# Patient Record
Sex: MALE | NOT HISPANIC OR LATINO | Employment: FULL TIME | URBAN - METROPOLITAN AREA
[De-identification: names, ages, dates, MRNs, and addresses within clinical notes are randomized per-mention and may not be internally consistent; named-entity substitution may affect disease eponyms.]

---

## 2020-11-17 ENCOUNTER — TELEPHONE (OUTPATIENT)
Dept: NEUROSURGERY | Facility: CLINIC | Age: 35
End: 2020-11-17

## 2020-11-17 ENCOUNTER — OFFICE VISIT (OUTPATIENT)
Dept: NEUROSURGERY | Facility: CLINIC | Age: 35
End: 2020-11-17

## 2020-11-17 ENCOUNTER — PATIENT MESSAGE (OUTPATIENT)
Dept: NEUROSURGERY | Facility: CLINIC | Age: 35
End: 2020-11-17

## 2020-11-17 ENCOUNTER — HOSPITAL ENCOUNTER (OUTPATIENT)
Dept: GENERAL RADIOLOGY | Facility: HOSPITAL | Age: 35
Discharge: HOME | End: 2020-11-17
Admitting: PHYSICIAN ASSISTANT

## 2020-11-17 VITALS
SYSTOLIC BLOOD PRESSURE: 120 MMHG | BODY MASS INDEX: 29.44 KG/M2 | DIASTOLIC BLOOD PRESSURE: 80 MMHG | TEMPERATURE: 96.8 F | HEIGHT: 69 IN | WEIGHT: 198.8 LBS

## 2020-11-17 DIAGNOSIS — G89.29 CHRONIC MIDLINE THORACIC BACK PAIN: ICD-10-CM

## 2020-11-17 DIAGNOSIS — M54.6 CHRONIC MIDLINE THORACIC BACK PAIN: ICD-10-CM

## 2020-11-17 DIAGNOSIS — Z71.6 ENCOUNTER FOR SMOKING CESSATION COUNSELING: ICD-10-CM

## 2020-11-17 DIAGNOSIS — M50.30 DEGENERATION OF CERVICAL INTERVERTEBRAL DISC: ICD-10-CM

## 2020-11-17 DIAGNOSIS — G89.29 CHRONIC MIDLINE THORACIC BACK PAIN: Primary | ICD-10-CM

## 2020-11-17 DIAGNOSIS — M54.6 CHRONIC MIDLINE THORACIC BACK PAIN: Primary | ICD-10-CM

## 2020-11-17 DIAGNOSIS — S39.012A STRAIN, BACK, INITIAL ENCOUNTER: ICD-10-CM

## 2020-11-17 PROCEDURE — 72080 X-RAY EXAM THORACOLMB 2/> VW: CPT

## 2020-11-17 PROCEDURE — 99204 OFFICE O/P NEW MOD 45 MIN: CPT | Performed by: PHYSICIAN ASSISTANT

## 2020-11-17 RX ORDER — OMEPRAZOLE 20 MG/1
CAPSULE, DELAYED RELEASE ORAL
COMMUNITY
Start: 2020-11-10

## 2020-11-17 RX ORDER — IBUPROFEN 600 MG/1
600 TABLET ORAL EVERY 6 HOURS PRN
COMMUNITY

## 2020-11-17 NOTE — TELEPHONE ENCOUNTER
From: Cristhian Sherman  To: Luna Euceda PA-C  Sent: 11/17/2020 2:10 PM EST  Subject: Non-Urgent Medical Question    Do you have an idea when I can return to my normal line of work? I know it will be painful i just don't want to rush it.

## 2020-11-17 NOTE — TELEPHONE ENCOUNTER
PT IS WANTING A RETURN TO WORK NOTE. HOWEVER, HE IS STILL STATING THAT HE IS IN A LOT OF PAIN AND ONLY GETTING MINIMAL SLEEP DUE TO THE PAIN.  PT NUMBER [phone]  PLEASE ADVISE

## 2020-11-17 NOTE — PROGRESS NOTES
Patient: Cristhian Sherman  : 1985  Chart #: [id number]    Date of Service: 2020    CHIEF COMPLAINT: Diffuse spinal pain    History of Present Illness Mr. 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  He is new to our clinic.  He denies significant history of back difficulties.  On 2020, he was at work pulling carpet when he felt a pop in his neck and mid back.  Since that time he has had neck pain mid back pain and low back pain.  The mid back pain bothers him most.  He has been treated with formal physical therapy.  He says this is helped him feel more limber and helped with stiffness.  It has not helped significantly with his pain.  He has been taking ibuprofen and Flexeril.  His symptoms are worse when standing longer than an hour or sitting.  Nothing significantly improves his pain.  He denies radicular complaints including numbness, weakness, or pain into the upper or lower extremities.  No bowel or bladder difficulties.  He does smoke tobacco products heavily x26 years.      History reviewed. No pertinent past medical history.      Current Outpatient Medications:   •  ibuprofen (ADVIL,MOTRIN) 600 MG tablet, Take 600 mg by mouth Every 6 (Six) Hours As Needed for Mild Pain ., Disp: , Rfl:   •  omeprazole (priLOSEC) 20 MG capsule, , Disp: , Rfl:     Past Surgical History:   Procedure Laterality Date   • INGUINAL HERNIA REPAIR         Social History     Socioeconomic History   • Marital status:      Spouse name: Not on file   • Number of children: Not on file   • Years of education: Not on file   • Highest education level: Not on file   Tobacco Use   • Smoking status: Current Every Day Smoker     Packs/day: 1.50     Years: 26.00     Pack years: 39.00     Types: Cigarettes     Start date: 1994   • Smokeless tobacco: Never Used   Substance and Sexual Activity   • Alcohol use: Yes     Comment: socially   • Drug use: Yes     Types: Marijuana      Comment: stopped 5 years ago   • Sexual activity: Defer     I discussed >3m the need to STOP smoking, there is a direct correlation between smoking and wound healing and degenerative disc disease. Patient will take this under advisement and discuss with their primary provider.    Review of Systems   Constitutional: Negative for activity change, appetite change, chills, diaphoresis, fatigue, fever and unexpected weight change.   HENT: Negative for congestion, dental problem, drooling, ear discharge, ear pain, facial swelling, hearing loss, mouth sores, nosebleeds, postnasal drip, rhinorrhea, sinus pressure, sinus pain, sneezing, sore throat, tinnitus, trouble swallowing and voice change.    Eyes: Negative for photophobia, pain, discharge, redness, itching and visual disturbance.   Respiratory: Negative for apnea, cough, choking, chest tightness, shortness of breath, wheezing and stridor.    Cardiovascular: Negative for chest pain, palpitations and leg swelling.   Gastrointestinal: Negative for abdominal distention, abdominal pain, anal bleeding, blood in stool, constipation, diarrhea, nausea, rectal pain and vomiting.   Endocrine: Negative for cold intolerance, heat intolerance, polydipsia, polyphagia and polyuria.   Genitourinary: Negative for decreased urine volume, difficulty urinating, discharge, dysuria, enuresis, flank pain, frequency, genital sores, hematuria, penile pain, penile swelling, scrotal swelling, testicular pain and urgency.   Musculoskeletal: Positive for back pain. Negative for arthralgias, gait problem, joint swelling, myalgias, neck pain and neck stiffness.   Skin: Negative for color change, pallor, rash and wound.   Allergic/Immunologic: Negative for environmental allergies, food allergies and immunocompromised state.   Neurological: Negative for dizziness, tremors, seizures, syncope, facial asymmetry, speech difficulty, weakness, light-headedness, numbness and headaches.   Hematological:  "Negative for adenopathy. Does not bruise/bleed easily.   Psychiatric/Behavioral: Negative for agitation, behavioral problems, confusion, decreased concentration, dysphoric mood, hallucinations, self-injury, sleep disturbance and suicidal ideas. The patient is not nervous/anxious and is not hyperactive.        Objective   Vital Signs: Blood pressure 120/80, temperature 96.8 °F (36 °C), temperature source Infrared, height 175.3 cm (69\"), weight 90.2 kg (198 lb 12.8 oz).  Physical Exam  Vitals signs and nursing note reviewed.   Constitutional:       General: He is not in acute distress.     Appearance: He is well-developed.   HENT:      Head: Normocephalic and atraumatic.   Eyes:      Pupils: Pupils are equal, round, and reactive to light.   Cardiovascular:      Heart sounds: Normal heart sounds.   Pulmonary:      Breath sounds: Normal breath sounds.   Psychiatric:         Behavior: Behavior normal.         Thought Content: Thought content normal.     Musculoskeletal:     Strength is intact in upper and lower extremities to direct testing.     Station and gait are normal.     Straight leg raising is negative.   Neurologic:     Muscle tone is normal throughout.     Coordination is intact.     Deep tendon reflexes: 2+ and symmetrical.     Sensation is intact to light touch throughout.     Patient is oriented to person, place, and time.     Curry sign negative.        Independent review of radiographic imaging: MRI of the cervical spine demonstrates some disc disease and spondylosis at a couple levels.  No high-grade root compromise or central stenosis.    Assessment/Plan   Diagnosis: Diffuse axial strain    Medical Decision Making: Patient needs more time. I do not see anything alarming on his cervical MRI.  He does not have an active radiculopathy or myelopathy for which a surgical intervention could be warranted.  He mainly complains of mid back pain.  For reassurance I am going to check some plain films of the " thoracic spine and call patient.  He has been on light duty work status since June of this year.  He finishes his physical therapy tomorrow.  At this point I see no reason he cannot return to his normal duties and he is agreeable to this.  If symptoms persist, a referral to pain management may be something to consider.    Diagnoses and all orders for this visit:    1. Chronic midline thoracic back pain (Primary)  -     XR Spine Thoracolumbar 2 View; Future    2. Degeneration of cervical intervertebral disc  -     XR Spine Thoracolumbar 2 View; Future    3. Encounter for smoking cessation counseling    4. Strain, back, initial encounter                        Patient's Body mass index is 29.36 kg/m². BMI is above normal parameters. Recommendations include: exercise counseling and nutrition counseling.         Luna Euceda PA-C  Patient Care Team:  Provider, No Known as PCP - Celine Mota APRN as Referring Physician (Family Medicine)    Addendum: I reviewed thoracic x-rays and did not find an acute process.  Normal alignment without fracture.  I called patient to discuss.

## 2020-11-18 NOTE — TELEPHONE ENCOUNTER
Called Pt to iterate basic pain management suggestions for home (Ice, tylenol, heat, PT stretches, active,) Left a message for him to call back.

## 2020-11-19 ENCOUNTER — TELEPHONE (OUTPATIENT)
Dept: NEUROSURGERY | Facility: CLINIC | Age: 35
End: 2020-11-19

## 2020-11-19 NOTE — TELEPHONE ENCOUNTER
[facility] NEEDS THE LAST OV NOTE AND WORK STATUS AND IF HE HAS A FOLLOW UP FAXED TO HER.    [phone] - FAX  [phone] - SAE

## 2021-03-23 ENCOUNTER — BULK ORDERING (OUTPATIENT)
Dept: CASE MANAGEMENT | Facility: OTHER | Age: 36
End: 2021-03-23

## 2021-03-23 DIAGNOSIS — Z23 IMMUNIZATION DUE: ICD-10-CM
